# Patient Record
Sex: MALE
[De-identification: names, ages, dates, MRNs, and addresses within clinical notes are randomized per-mention and may not be internally consistent; named-entity substitution may affect disease eponyms.]

---

## 2023-05-31 PROBLEM — Z00.00 ENCOUNTER FOR PREVENTIVE HEALTH EXAMINATION: Status: ACTIVE | Noted: 2023-05-31

## 2023-06-02 ENCOUNTER — APPOINTMENT (OUTPATIENT)
Dept: ORTHOPEDIC SURGERY | Facility: CLINIC | Age: 53
End: 2023-06-02
Payer: COMMERCIAL

## 2023-06-02 VITALS
HEIGHT: 75 IN | WEIGHT: 237 LBS | SYSTOLIC BLOOD PRESSURE: 146 MMHG | BODY MASS INDEX: 29.47 KG/M2 | DIASTOLIC BLOOD PRESSURE: 92 MMHG | OXYGEN SATURATION: 99 % | HEART RATE: 64 BPM

## 2023-06-02 DIAGNOSIS — Z78.9 OTHER SPECIFIED HEALTH STATUS: ICD-10-CM

## 2023-06-02 DIAGNOSIS — Z87.09 PERSONAL HISTORY OF OTHER DISEASES OF THE RESPIRATORY SYSTEM: ICD-10-CM

## 2023-06-02 PROCEDURE — 73503 X-RAY EXAM HIP UNI 4/> VIEWS: CPT | Mod: LT

## 2023-06-02 PROCEDURE — 99204 OFFICE O/P NEW MOD 45 MIN: CPT

## 2023-06-02 RX ORDER — MONTELUKAST SODIUM 10 MG/1
TABLET, FILM COATED ORAL
Refills: 0 | Status: ACTIVE | COMMUNITY

## 2023-06-02 RX ORDER — MELOXICAM 15 MG/1
15 TABLET ORAL
Qty: 30 | Refills: 2 | Status: ACTIVE | COMMUNITY
Start: 2023-06-02 | End: 1900-01-01

## 2023-06-02 RX ORDER — FLUTICASONE FUROATE, UMECLIDINIUM BROMIDE AND VILANTEROL TRIFENATATE 200; 62.5; 25 UG/1; UG/1; UG/1
POWDER RESPIRATORY (INHALATION)
Refills: 0 | Status: ACTIVE | COMMUNITY

## 2023-06-02 NOTE — PHYSICAL EXAM
[de-identified] : Physical exam he is walking with some antalgia on the left side but there is no Trendelenburg.  He does have pain with impingement testing on that side.  He is neurologically intact.  Ankle dorsiflexion plantarflexion foot eversion foot inversion and eversion is 5 out of 5.  L3-S1 sensation light touch is intact. [de-identified] : 4 views of the left hip show some mild to moderate arthritis with joint space narrowing and evidence for CAM deformity.  There is a right total hip arthroplasty with no significant hardware complication loosening fracture or dislocation.

## 2023-06-02 NOTE — REVIEW OF SYSTEMS
[Arthralgia] : no arthralgia [Joint Stiffness] : no joint stiffness [Joint Pain] : joint pain [Joint Swelling] : no joint swelling [Fever] : no fever [Chills] : no chills

## 2023-06-02 NOTE — DISCUSSION/SUMMARY
[de-identified] : Left hip pain in a patient with some arthritis.  I suspect this is an exacerbation of arthritis though I cannot completely rule out internal derangement or other soft tissue etiology.  We will start with conservative treatments.  We discussed use of ice and heat as well as appropriate medication use.  After discussing risks and benefits she provided an anti-inflammatory prescription.  Discussed use of acetaminophen as well.  I provided a prescription for physical therapy.  We will see how he responds follow-up in 6 weeks 1 hesitate to contact the any significant worsening in the interim

## 2023-06-02 NOTE — HISTORY OF PRESENT ILLNESS
[de-identified] : GonePatient is a 53-year-old gentleman who is here for evaluation of his left hip.  Previous had a right hip replacement over 25 years ago and has no issues with it.  No pain or limitations.  The last 2 weeks he has noticed similar pain in the left side in the groin.  He does not have any specific trauma or injury that he can recall.  Try to treat it with some Aleve adequate relief.  Pain is worse with activity.  Not using any assistive devices.  No fevers chills night sweats nausea vomiting

## 2023-08-02 ENCOUNTER — APPOINTMENT (OUTPATIENT)
Dept: ORTHOPEDIC SURGERY | Facility: CLINIC | Age: 53
End: 2023-08-02

## 2023-08-16 ENCOUNTER — APPOINTMENT (OUTPATIENT)
Dept: ORTHOPEDIC SURGERY | Facility: CLINIC | Age: 53
End: 2023-08-16
Payer: COMMERCIAL

## 2023-08-16 VITALS
HEART RATE: 70 BPM | BODY MASS INDEX: 29.47 KG/M2 | SYSTOLIC BLOOD PRESSURE: 139 MMHG | OXYGEN SATURATION: 98 % | HEIGHT: 75 IN | DIASTOLIC BLOOD PRESSURE: 95 MMHG | WEIGHT: 237 LBS

## 2023-08-16 DIAGNOSIS — Z96.641 PRESENCE OF RIGHT ARTIFICIAL HIP JOINT: ICD-10-CM

## 2023-08-16 DIAGNOSIS — M16.12 UNILATERAL PRIMARY OSTEOARTHRITIS, LEFT HIP: ICD-10-CM

## 2023-08-16 PROCEDURE — 99213 OFFICE O/P EST LOW 20 MIN: CPT

## 2023-08-16 NOTE — PHYSICAL EXAM
[de-identified] : On exam he is alert and oriented.  He is walking smoothly with no significant antalgia.  There is some stiffness to the left hip compared to the right.  There is more limited internal rotation.  He is neurologically intact. [de-identified] : Previous x-rays do show a right total hip arthroplasty with no hardware complication loosening fracture or dislocation and significant left hip arthritis but still some joint space remaining

## 2023-08-16 NOTE — DISCUSSION/SUMMARY
[de-identified] : Left hip arthritis.  I had a long discussion with the patient regarding hip arthritis / degenerative disease and treatment options. We reviewed the nature and etiology of degenerative hip disease.  We discussed the spectrum of symptomatic treatments. Our discussion included the use of appropriate exercises, activity modifications, weight reduction and maintenance, appropriate medication use, use of assistive devices, role of injections and avoidance of high impact activities.  We talked about using meloxicam only as needed and using Tylenol for pain relief as well.  We emphasized the avoidance of high impact activities and maintenance of low weight is the best ways to preserve that left hip joint long-term.  He is going to continue with physical therapy to work on stretching as things continue to improve and I asked him to work on them to get a home program.  I do recommend a yearly follow-up for his hips particularly given the amount of time the right hip replacements been in place but he will hesitate to contact us in the interim if there is any significant change or concern.  All questions answered.

## 2023-08-16 NOTE — HISTORY OF PRESENT ILLNESS
[de-identified] : Patient is 53-year-old gentleman presents in follow-up.  He has had a right hip replacement remotely which continues to be well.  We previously saw him he had some exacerbation of left hip arthritis.  He treated it with meloxicam which she is now taking much less frequently.  He also did physical therapy.  He feels like that is continuing to benefit him.  Pain is decreased significantly although he still feels some stiffness and struggles with rotation for shoes and socks compared to the right hip.  He does think that things are improving with PT